# Patient Record
Sex: MALE | RURAL
[De-identification: names, ages, dates, MRNs, and addresses within clinical notes are randomized per-mention and may not be internally consistent; named-entity substitution may affect disease eponyms.]

---

## 2022-11-15 ENCOUNTER — APPOINTMENT (RX ONLY)
Dept: RURAL CLINIC 3 | Facility: CLINIC | Age: 72
Setting detail: DERMATOLOGY
End: 2022-11-15

## 2022-11-15 DIAGNOSIS — M71 OTHER BURSOPATHIES: ICD-10-CM

## 2022-11-15 PROBLEM — D48.5 NEOPLASM OF UNCERTAIN BEHAVIOR OF SKIN: Status: ACTIVE | Noted: 2022-11-15

## 2022-11-15 PROCEDURE — ? TREATMENT REGIMEN

## 2022-11-15 PROCEDURE — ? REFERRAL

## 2022-11-15 PROCEDURE — 99202 OFFICE O/P NEW SF 15 MIN: CPT

## 2022-11-15 ASSESSMENT — LOCATION SIMPLE DESCRIPTION DERM: LOCATION SIMPLE: RIGHT MIDDLE FINGER

## 2022-11-15 ASSESSMENT — LOCATION ZONE DERM: LOCATION ZONE: FINGERNAIL

## 2022-11-15 ASSESSMENT — LOCATION DETAILED DESCRIPTION DERM: LOCATION DETAILED: RIGHT MIDDLE FINGERNAIL

## 2022-11-15 NOTE — PROCEDURE: TREATMENT REGIMEN
Detail Level: Detailed
Plan: Prepped lesion with alcohol and incised with 30 gauge needle, but unable to express any myxoid contents to support digital mucous cyst as diagnosis.  Given degree of associated nail dystrophy, discussed other possible etiologies, and recommended possible nail biopsy.  Will refer to Dr. Nolan Rubio with [facility] (will see if he ever holds clinic in [place], otherwise pt. willing to travel to [facility] if necessary)